# Patient Record
Sex: FEMALE | Race: BLACK OR AFRICAN AMERICAN | NOT HISPANIC OR LATINO | URBAN - METROPOLITAN AREA
[De-identification: names, ages, dates, MRNs, and addresses within clinical notes are randomized per-mention and may not be internally consistent; named-entity substitution may affect disease eponyms.]

---

## 2017-01-17 ENCOUNTER — EMERGENCY (EMERGENCY)
Facility: HOSPITAL | Age: 32
LOS: 1 days | Discharge: HOME | End: 2017-01-17
Attending: EMERGENCY MEDICINE | Admitting: EMERGENCY MEDICINE
Payer: COMMERCIAL

## 2017-01-17 VITALS
OXYGEN SATURATION: 97 % | HEART RATE: 70 BPM | RESPIRATION RATE: 16 BRPM | DIASTOLIC BLOOD PRESSURE: 70 MMHG | SYSTOLIC BLOOD PRESSURE: 99 MMHG | WEIGHT: 149.91 LBS | TEMPERATURE: 98 F | HEIGHT: 67 IN

## 2017-01-17 DIAGNOSIS — Z79.899 OTHER LONG TERM (CURRENT) DRUG THERAPY: ICD-10-CM

## 2017-01-17 DIAGNOSIS — R07.89 OTHER CHEST PAIN: ICD-10-CM

## 2017-01-17 PROCEDURE — 99284 EMERGENCY DEPT VISIT MOD MDM: CPT | Mod: 25

## 2017-01-17 PROCEDURE — 71020: CPT | Mod: 26

## 2017-01-17 PROCEDURE — 93010 ELECTROCARDIOGRAM REPORT: CPT

## 2017-01-17 RX ORDER — ACETAMINOPHEN 500 MG
650 TABLET ORAL ONCE
Qty: 0 | Refills: 0 | Status: COMPLETED | OUTPATIENT
Start: 2017-01-17 | End: 2017-01-17

## 2017-01-17 RX ORDER — ALPRAZOLAM 0.25 MG
0.25 TABLET ORAL ONCE
Qty: 0 | Refills: 0 | Status: DISCONTINUED | OUTPATIENT
Start: 2017-01-17 | End: 2017-01-17

## 2017-01-17 RX ADMIN — Medication 0.25 MILLIGRAM(S): at 23:18

## 2017-01-17 RX ADMIN — Medication 650 MILLIGRAM(S): at 23:07

## 2017-01-17 NOTE — ED PROVIDER NOTE - INTERPRETATION
normal sinus rhythm, Normal axis, Normal PA interval and QRS complex. There are no acute ischemic ST or T-wave changes.

## 2017-01-17 NOTE — ED ADULT NURSE NOTE - OBJECTIVE STATEMENT
Pt. c/o chest pain for a few hours, reports pain worsens when sitting up and moving around, denies any SOB LUCIEN.

## 2017-01-17 NOTE — ED PROVIDER NOTE - OBJECTIVE STATEMENT
30 yo female comes to the ED for evaluation.  While at rest, mid sternal sharp chest pain, constant, mild, no radiation.  Improved when flat.  Denies sob, nausea, vomiting, fever or chills.  Non smoker.  Takes OCPs.  No significant family history.  No pertinent medical history.

## 2017-01-17 NOTE — ED ADULT TRIAGE NOTE - CHIEF COMPLAINT QUOTE
"I have chest pain." pt with complaint of left sided chest pain radiating to right side that started 2 hours ago. Reports pain improved with laying on back. Denies n/v, palpitations, SOB. Reports dizziness.

## 2017-01-17 NOTE — ED PROVIDER NOTE - MEDICAL DECISION MAKING DETAILS
32 yo female atypical chest pain since this evening.  Non smoker.  On OCPs.  No cough or fever.  Lungs clear, no murmurs.  Well appearing, slightly anxious.  EKG nsr.  CXR, xanax, tylenol ordered.

## 2017-01-17 NOTE — ED PROVIDER NOTE - DIAGNOSTIC INTERPRETATION
ER Physician: Wesley Plaza  CHEST XRAY INTERPRETATION: lungs clear, heart shadow normal, bony structures intact

## 2017-01-17 NOTE — ED PROVIDER NOTE - PROGRESS NOTE DETAILS
CXR wnl.  EKG ischemic. Do not suspect ace, pneumothorax or PE based on history and exam.  Conservative management discussed with the patient in detail.  Close PMD follow up encouraged.  Strict ED return instructions discussed in detail and patient given the opportunity to ask any questions about their discharge diagnosis and instructions